# Patient Record
Sex: FEMALE | Race: BLACK OR AFRICAN AMERICAN | URBAN - METROPOLITAN AREA
[De-identification: names, ages, dates, MRNs, and addresses within clinical notes are randomized per-mention and may not be internally consistent; named-entity substitution may affect disease eponyms.]

---

## 2023-04-03 ENCOUNTER — WEB ENCOUNTER (OUTPATIENT)
Dept: URBAN - METROPOLITAN AREA CLINIC 70 | Facility: CLINIC | Age: 33
End: 2023-04-03

## 2023-04-03 ENCOUNTER — LAB OUTSIDE AN ENCOUNTER (OUTPATIENT)
Dept: URBAN - METROPOLITAN AREA CLINIC 70 | Facility: CLINIC | Age: 33
End: 2023-04-03

## 2023-04-03 ENCOUNTER — OFFICE VISIT (OUTPATIENT)
Dept: URBAN - METROPOLITAN AREA CLINIC 70 | Facility: CLINIC | Age: 33
End: 2023-04-03
Payer: MEDICAID

## 2023-04-03 VITALS
HEART RATE: 97 BPM | OXYGEN SATURATION: 97 % | DIASTOLIC BLOOD PRESSURE: 88 MMHG | WEIGHT: 205.4 LBS | HEIGHT: 65 IN | SYSTOLIC BLOOD PRESSURE: 130 MMHG | BODY MASS INDEX: 34.22 KG/M2

## 2023-04-03 DIAGNOSIS — K21.9 GASTROESOPHAGEAL REFLUX DISEASE, UNSPECIFIED WHETHER ESOPHAGITIS PRESENT: ICD-10-CM

## 2023-04-03 DIAGNOSIS — R11.10 VOMITING, UNSPECIFIED VOMITING TYPE, UNSPECIFIED WHETHER NAUSEA PRESENT: ICD-10-CM

## 2023-04-03 DIAGNOSIS — R19.7 DIARRHEA, UNSPECIFIED TYPE: ICD-10-CM

## 2023-04-03 PROBLEM — 62315008: Status: ACTIVE | Noted: 2023-04-03

## 2023-04-03 PROBLEM — 422400008: Status: ACTIVE | Noted: 2023-04-03

## 2023-04-03 PROBLEM — 235595009: Status: ACTIVE | Noted: 2023-04-03

## 2023-04-03 PROCEDURE — 99204 OFFICE O/P NEW MOD 45 MIN: CPT | Performed by: INTERNAL MEDICINE

## 2023-04-03 RX ORDER — PANTOPRAZOLE SODIUM 40 MG/1
1 TABLET TABLET, DELAYED RELEASE ORAL ONCE A DAY
Qty: 30 | OUTPATIENT
Start: 2023-04-03

## 2023-04-03 NOTE — HPI-TODAY'S VISIT:
32 year old pleasant female here with C/o vomiting and diarrhea  Started about a year ago  Patient reports having episodes few days of the week where she would get vomiting and diarrhea and then will have symptom free interval  Reportys having intermittent symptoms about 8 times a month Reports having 3 episodes of large loose watery stool per day when she has diarrhea Reports having crampy abdominal pain that improves with BM  Reports noticing questionable blood in stool at one occasion three weeks ago Reports having intermittent GERD Denies unintentional weight loss, lack of appetite

## 2023-04-03 NOTE — PHYSICAL EXAM RECTAL:
normal tone, no external hemorrhoids, no masses palpable, no red blood, Tenderness on KEZIA, Internal hemorrhoids present

## 2023-04-11 ENCOUNTER — TELEPHONE ENCOUNTER (OUTPATIENT)
Dept: URBAN - METROPOLITAN AREA CLINIC 70 | Facility: CLINIC | Age: 33
End: 2023-04-11

## 2023-04-11 RX ORDER — ONDANSETRON 4 MG/1
1 TABLET ON THE TONGUE AND ALLOW TO DISSOLVE TABLET, ORALLY DISINTEGRATING ORAL
Qty: 30 | Refills: 0 | OUTPATIENT
Start: 2023-04-12

## 2023-04-11 RX ORDER — PANTOPRAZOLE SODIUM 40 MG/1
1 TABLET TABLET, DELAYED RELEASE ORAL ONCE A DAY
Qty: 30 | COMMUNITY
Start: 2023-04-03

## 2023-04-12 ENCOUNTER — LAB OUTSIDE AN ENCOUNTER (OUTPATIENT)
Dept: URBAN - METROPOLITAN AREA CLINIC 70 | Facility: CLINIC | Age: 33
End: 2023-04-12

## 2023-04-12 PROBLEM — 12063002: Status: ACTIVE | Noted: 2023-04-12

## 2023-04-12 PROBLEM — 422587007: Status: ACTIVE | Noted: 2023-04-12

## 2023-04-27 ENCOUNTER — OFFICE VISIT (OUTPATIENT)
Dept: URBAN - METROPOLITAN AREA SURGERY CENTER 24 | Facility: SURGERY CENTER | Age: 33
End: 2023-04-27
Payer: MEDICAID

## 2023-04-27 DIAGNOSIS — K21.9 GASTROESOPHAGEAL REFLUX DISEASE, UNSPECIFIED WHETHER ESOPHAGITIS PRESENT: ICD-10-CM

## 2023-04-27 DIAGNOSIS — K29.60 OTHER GASTRITIS WITHOUT BLEEDING: ICD-10-CM

## 2023-04-27 DIAGNOSIS — R11.10 VOMITING, UNSPECIFIED VOMITING TYPE, UNSPECIFIED WHETHER NAUSEA PRESENT: ICD-10-CM

## 2023-04-27 PROCEDURE — 43239 EGD BIOPSY SINGLE/MULTIPLE: CPT | Performed by: INTERNAL MEDICINE

## 2023-04-27 PROCEDURE — G8907 PT DOC NO EVENTS ON DISCHARG: HCPCS | Performed by: INTERNAL MEDICINE

## 2023-04-27 RX ORDER — ONDANSETRON 4 MG/1
1 TABLET ON THE TONGUE AND ALLOW TO DISSOLVE TABLET, ORALLY DISINTEGRATING ORAL
Qty: 30 | Refills: 0 | Status: ACTIVE | COMMUNITY
Start: 2023-04-12

## 2023-04-27 RX ORDER — PANTOPRAZOLE SODIUM 40 MG/1
1 TABLET TABLET, DELAYED RELEASE ORAL ONCE A DAY
Qty: 30 | COMMUNITY
Start: 2023-04-03

## 2023-06-01 ENCOUNTER — OFFICE VISIT (OUTPATIENT)
Dept: URBAN - METROPOLITAN AREA CLINIC 70 | Facility: CLINIC | Age: 33
End: 2023-06-01

## 2023-06-01 RX ORDER — PANTOPRAZOLE SODIUM 40 MG/1
1 TABLET TABLET, DELAYED RELEASE ORAL ONCE A DAY
Qty: 30 | COMMUNITY
Start: 2023-04-03

## 2023-06-01 RX ORDER — ONDANSETRON 4 MG/1
1 TABLET ON THE TONGUE AND ALLOW TO DISSOLVE TABLET, ORALLY DISINTEGRATING ORAL
Qty: 30 | Refills: 0 | Status: ACTIVE | COMMUNITY
Start: 2023-04-12

## 2023-07-18 ENCOUNTER — LAB OUTSIDE AN ENCOUNTER (OUTPATIENT)
Dept: URBAN - METROPOLITAN AREA CLINIC 70 | Facility: CLINIC | Age: 33
End: 2023-07-18

## 2023-07-18 ENCOUNTER — OFFICE VISIT (OUTPATIENT)
Dept: URBAN - METROPOLITAN AREA CLINIC 70 | Facility: CLINIC | Age: 33
End: 2023-07-18
Payer: MEDICAID

## 2023-07-18 VITALS
HEIGHT: 65 IN | DIASTOLIC BLOOD PRESSURE: 77 MMHG | BODY MASS INDEX: 33.76 KG/M2 | HEART RATE: 102 BPM | WEIGHT: 202.6 LBS | SYSTOLIC BLOOD PRESSURE: 132 MMHG

## 2023-07-18 DIAGNOSIS — K21.9 GASTROESOPHAGEAL REFLUX DISEASE WITHOUT ESOPHAGITIS: ICD-10-CM

## 2023-07-18 DIAGNOSIS — K59.04 CHRONIC IDIOPATHIC CONSTIPATION: ICD-10-CM

## 2023-07-18 DIAGNOSIS — K62.5 RECTAL BLEEDING: ICD-10-CM

## 2023-07-18 DIAGNOSIS — R11.2 NAUSEA AND VOMITING, UNSPECIFIED VOMITING TYPE: ICD-10-CM

## 2023-07-18 PROBLEM — 266435005: Status: ACTIVE | Noted: 2023-07-18

## 2023-07-18 PROBLEM — 82934008: Status: ACTIVE | Noted: 2023-07-18

## 2023-07-18 PROCEDURE — 99214 OFFICE O/P EST MOD 30 MIN: CPT

## 2023-07-18 RX ORDER — PROMETHAZINE HYDROCHLORIDE 12.5 MG/1
1 TABLET AS NEEDED TABLET ORAL
Qty: 360 TABLET | Refills: 1 | OUTPATIENT

## 2023-07-18 RX ORDER — PROMETHAZINE HYDROCHLORIDE 12.5 MG/1
1 TABLET AS NEEDED TABLET ORAL
Status: ACTIVE | COMMUNITY

## 2023-07-18 RX ORDER — ONDANSETRON 4 MG/1
1 TABLET ON THE TONGUE AND ALLOW TO DISSOLVE TABLET, ORALLY DISINTEGRATING ORAL
Qty: 30 | Refills: 0 | Status: ON HOLD | COMMUNITY
Start: 2023-04-12

## 2023-07-18 RX ORDER — PANTOPRAZOLE SODIUM 40 MG/1
TAKE 1 TABLET IN THE MORNING ON AN EMPTY STOMACH, WAIT 30 MINUTES, THEN START EATING TABLET, DELAYED RELEASE ORAL ONCE A DAY
Qty: 90 | Refills: 3
Start: 2023-04-03

## 2023-07-18 RX ORDER — PANTOPRAZOLE SODIUM 40 MG/1
1 TABLET TABLET, DELAYED RELEASE ORAL ONCE A DAY
Qty: 30 | Status: ACTIVE | COMMUNITY
Start: 2023-04-03

## 2023-07-18 NOTE — HPI-TODAY'S VISIT:
The pt presents for a f/u for GERD and changes in bowel movements.  EGD 4/2023 showed gasritis.  Biopsies negative for H. pylori or dysplasia.  She satest symptoms have improved with Pantoprazole 40mg.  She states nausea is also improved with Promethasine.  She is requesting a refill for both.  She admits to daily constipation and rectal bleeding with bowel movements.  Has not tried anything for her symptoms. She was previously complaining of diarrhea at her last appt.  Labs ordered at her LOV were never completed.  She has never had a colonoscopy.  Denies a fhx of CRC or IBD.

## 2023-07-21 LAB
C-REACTIVE PROTEIN, QUANT: 0.7
SED RATE BY MODIFIED: 6

## 2023-08-18 ENCOUNTER — OFFICE VISIT (OUTPATIENT)
Dept: URBAN - METROPOLITAN AREA SURGERY CENTER 24 | Facility: SURGERY CENTER | Age: 33
End: 2023-08-18

## 2024-04-03 ENCOUNTER — OV EP (OUTPATIENT)
Dept: URBAN - METROPOLITAN AREA CLINIC 70 | Facility: CLINIC | Age: 34
End: 2024-04-03
Payer: COMMERCIAL

## 2024-04-03 ENCOUNTER — LAB (OUTPATIENT)
Dept: URBAN - METROPOLITAN AREA CLINIC 70 | Facility: CLINIC | Age: 34
End: 2024-04-03

## 2024-04-03 VITALS
DIASTOLIC BLOOD PRESSURE: 78 MMHG | TEMPERATURE: 98.6 F | HEART RATE: 91 BPM | WEIGHT: 179.9 LBS | HEIGHT: 62 IN | SYSTOLIC BLOOD PRESSURE: 124 MMHG | BODY MASS INDEX: 33.1 KG/M2

## 2024-04-03 DIAGNOSIS — K59.04 CHRONIC IDIOPATHIC CONSTIPATION: ICD-10-CM

## 2024-04-03 DIAGNOSIS — R11.2 NAUSEA AND VOMITING, UNSPECIFIED VOMITING TYPE: ICD-10-CM

## 2024-04-03 DIAGNOSIS — K21.9 GASTROESOPHAGEAL REFLUX DISEASE WITHOUT ESOPHAGITIS: ICD-10-CM

## 2024-04-03 PROCEDURE — 99214 OFFICE O/P EST MOD 30 MIN: CPT | Performed by: NURSE PRACTITIONER

## 2024-04-03 RX ORDER — ONDANSETRON 4 MG/1
1 TABLET ON THE TONGUE AND ALLOW TO DISSOLVE TABLET, ORALLY DISINTEGRATING ORAL
Qty: 30 | Refills: 0 | Status: ON HOLD | COMMUNITY
Start: 2023-04-12

## 2024-04-03 RX ORDER — LACTULOSE 10 G/15ML
30ML SOLUTION ORAL ONCE A DAY
Qty: 900 ML | Refills: 5 | OUTPATIENT
Start: 2024-04-03 | End: 2024-09-30

## 2024-04-03 RX ORDER — PANTOPRAZOLE SODIUM 40 MG/1
TAKE 1 TABLET IN THE MORNING ON AN EMPTY STOMACH, WAIT 30 MINUTES, THEN START EATING TABLET, DELAYED RELEASE ORAL ONCE A DAY
Qty: 90 | Refills: 3 | Status: ACTIVE | COMMUNITY
Start: 2023-04-03

## 2024-04-03 RX ORDER — SIMVASTATIN 10 MG/1
TABLET, FILM COATED ORAL
Qty: 90 TABLET | Status: ON HOLD | COMMUNITY

## 2024-04-03 RX ORDER — PROMETHAZINE HYDROCHLORIDE 12.5 MG/1
1 TABLET AS NEEDED TABLET ORAL
Qty: 360 TABLET | Refills: 1 | Status: ACTIVE | COMMUNITY

## 2024-04-03 RX ORDER — TIRZEPATIDE 5 MG/.5ML
INJECTION, SOLUTION SUBCUTANEOUS
Qty: 2 MILLILITER | Status: ACTIVE | COMMUNITY

## 2024-04-03 RX ORDER — PANTOPRAZOLE SODIUM 40 MG/1
TAKE 1 TABLET IN THE MORNING ON AN EMPTY STOMACH, WAIT 30 MINUTES, THEN START EATING TABLET, DELAYED RELEASE ORAL ONCE A DAY
Qty: 90 | Refills: 3 | OUTPATIENT

## 2024-04-03 NOTE — HPI-TODAY'S VISIT:
OV 7/18/23 JENNIFER RUSSELL: The pt presents for a f/u for GERD and changes in bowel movements.  EGD 4/2023 showed gasritis.  Biopsies negative for H. pylori or dysplasia.  She satest symptoms have improved with Pantoprazole 40mg.  She states nausea is also improved with Promethasine.  She is requesting a refill for both.  She admits to daily constipation and rectal bleeding with bowel movements.  Has not tried anything for her symptoms. She was previously complaining of diarrhea at her last appt.  Labs ordered at her LOV were never completed.  She has never had a colonoscopy.  Denies a fhx of CRC or IBD. ---------------------------- Today 4/3/24: Patient prsents today for continued N/V. She did not have colonoscopy completed that was ordered at last OV. Is taking PPI daily. Was started on mounjaro. Is not taking miralax as previously discussed for constipation. Denies fever, CP, dysphagia, or diarrhea. No further signs of GI bleeding.

## 2024-04-26 ENCOUNTER — OV EP (OUTPATIENT)
Dept: URBAN - METROPOLITAN AREA CLINIC 70 | Facility: CLINIC | Age: 34
End: 2024-04-26
Payer: COMMERCIAL

## 2024-04-26 VITALS
HEART RATE: 134 BPM | SYSTOLIC BLOOD PRESSURE: 144 MMHG | DIASTOLIC BLOOD PRESSURE: 91 MMHG | BODY MASS INDEX: 32.09 KG/M2 | TEMPERATURE: 98.1 F | HEIGHT: 62 IN | WEIGHT: 174.4 LBS

## 2024-04-26 DIAGNOSIS — K59.04 CHRONIC IDIOPATHIC CONSTIPATION: ICD-10-CM

## 2024-04-26 DIAGNOSIS — K21.9 GASTROESOPHAGEAL REFLUX DISEASE WITHOUT ESOPHAGITIS: ICD-10-CM

## 2024-04-26 DIAGNOSIS — R11.2 NAUSEA AND VOMITING, UNSPECIFIED VOMITING TYPE: ICD-10-CM

## 2024-04-26 PROCEDURE — 99214 OFFICE O/P EST MOD 30 MIN: CPT | Performed by: NURSE PRACTITIONER

## 2024-04-26 RX ORDER — TIRZEPATIDE 5 MG/.5ML
INJECTION, SOLUTION SUBCUTANEOUS
Qty: 2 MILLILITER | Status: ACTIVE | COMMUNITY

## 2024-04-26 RX ORDER — LACTULOSE 10 G/15ML
30ML SOLUTION ORAL TWICE A DAY
Qty: 1800 ML | Refills: 5 | OUTPATIENT

## 2024-04-26 RX ORDER — PANTOPRAZOLE SODIUM 40 MG/1
TAKE 1 TABLET IN THE MORNING ON AN EMPTY STOMACH, WAIT 30 MINUTES, THEN START EATING TABLET, DELAYED RELEASE ORAL ONCE A DAY
Qty: 90 | Refills: 3 | OUTPATIENT

## 2024-04-26 RX ORDER — PANTOPRAZOLE SODIUM 40 MG/1
TAKE 1 TABLET IN THE MORNING ON AN EMPTY STOMACH, WAIT 30 MINUTES, THEN START EATING TABLET, DELAYED RELEASE ORAL ONCE A DAY
Qty: 90 | Refills: 3 | Status: ACTIVE | COMMUNITY

## 2024-04-26 RX ORDER — PROMETHAZINE HYDROCHLORIDE 12.5 MG/1
1 TABLET AS NEEDED TABLET ORAL
Qty: 360 TABLET | Refills: 1 | Status: ACTIVE | COMMUNITY

## 2024-04-26 RX ORDER — SIMVASTATIN 10 MG/1
TABLET, FILM COATED ORAL
Qty: 90 TABLET | Status: ON HOLD | COMMUNITY

## 2024-04-26 RX ORDER — ONDANSETRON 4 MG/1
1 TABLET ON THE TONGUE AND ALLOW TO DISSOLVE TABLET, ORALLY DISINTEGRATING ORAL
Qty: 30 | Refills: 0 | Status: ON HOLD | COMMUNITY
Start: 2023-04-12

## 2024-04-26 RX ORDER — LACTULOSE 10 G/15ML
30ML SOLUTION ORAL ONCE A DAY
Qty: 900 ML | Refills: 5 | Status: ACTIVE | COMMUNITY
Start: 2024-04-03 | End: 2024-09-30

## 2024-04-26 NOTE — HPI-TODAY'S VISIT:
OV 7/18/23 JENNIFRE RUSSELL: The pt presents for a f/u for GERD and changes in bowel movements.  EGD 4/2023 showed gasritis.  Biopsies negative for H. pylori or dysplasia.  She satest symptoms have improved with Pantoprazole 40mg.  She states nausea is also improved with Promethasine.  She is requesting a refill for both.  She admits to daily constipation and rectal bleeding with bowel movements.  Has not tried anything for her symptoms. She was previously complaining of diarrhea at her last appt.  Labs ordered at her LOV were never completed.  She has never had a colonoscopy.  Denies a fhx of CRC or IBD. ---------------------------- OV 4/3/24: Patient prsents today for continued N/V. She did not have colonoscopy completed that was ordered at last OV. Is taking PPI daily. Was started on mounjaro. Is not taking miralax as previously discussed for constipation. Denies fever, CP, dysphagia, or diarrhea. No further signs of GI bleeding. --------------------------- Today 04/26/24: Patient presents today for follow up. She is requesting [facility] paperwork filled out for her job states she is still having to miss work due to her symptoms and is about to lose her job (she became upset when discussing and kept requesting me to put into writing why I previously stated I would not fill out paperwork; I explained to pt that I had only had 1appt with patient previously with her workup not complete and that I told her that she would need another appt to discuss not that I would not fill it out that ultimately the goal is to get her symptoms improved so she can work; She was informed that I would fill out the [facility] paperwork with what information that I have up to his point as soon as I can; she was not happy with any of my answers becoming further upset making it hard for her to listen or me to explain; pt likely needs to get another opinion from another provider in our group if she in unhappy with my response). Abdominal u/s 4/4/24 showed fatty liver but gallbladder normal. She remains on mounjaro. Is taking lactulose only once daily. Has continued intermittent N/V and constipation. HIDA scan previously orderd was not completed. No new GI complaints.

## 2024-05-17 ENCOUNTER — OFFICE VISIT (OUTPATIENT)
Dept: URBAN - METROPOLITAN AREA CLINIC 70 | Facility: CLINIC | Age: 34
End: 2024-05-17